# Patient Record
Sex: FEMALE | Race: WHITE | Employment: OTHER | URBAN - METROPOLITAN AREA
[De-identification: names, ages, dates, MRNs, and addresses within clinical notes are randomized per-mention and may not be internally consistent; named-entity substitution may affect disease eponyms.]

---

## 2017-03-21 PROBLEM — J18.9 PNEUMONIA DUE TO INFECTIOUS ORGANISM: Status: ACTIVE | Noted: 2017-03-21

## 2018-04-17 DIAGNOSIS — E78.00 PURE HYPERCHOLESTEROLEMIA: ICD-10-CM

## 2018-04-18 LAB
ESTIMATED AVERAGE GLUCOSE: 125.5 MG/DL
HBA1C MFR BLD: 6 %

## 2019-02-11 PROBLEM — J18.9 PNEUMONIA DUE TO INFECTIOUS ORGANISM: Status: RESOLVED | Noted: 2017-03-21 | Resolved: 2019-02-11

## 2022-03-07 PROBLEM — R94.31 ABNORMAL EKG: Status: ACTIVE | Noted: 2022-03-07

## 2022-03-07 PROBLEM — I77.810 ASCENDING AORTA DILATATION (HCC): Status: ACTIVE | Noted: 2022-03-07

## 2022-03-07 PROBLEM — R00.0 SINUS TACHYCARDIA: Status: ACTIVE | Noted: 2022-03-07

## 2022-12-12 PROBLEM — E11.9 TYPE 2 DIABETES MELLITUS (HCC): Status: ACTIVE | Noted: 2022-12-12

## 2023-06-04 PROBLEM — F33.1 MAJOR DEPRESSIVE DISORDER, RECURRENT, MODERATE (HCC): Status: ACTIVE | Noted: 2023-06-04

## 2023-06-04 PROBLEM — F33.0 MAJOR DEPRESSIVE DISORDER, RECURRENT, MILD (HCC): Status: ACTIVE | Noted: 2023-06-04

## 2023-06-09 PROBLEM — E66.01 OBESITY, CLASS III, BMI 40-49.9 (MORBID OBESITY) (HCC): Status: ACTIVE | Noted: 2023-06-09

## 2023-06-09 PROBLEM — S92.511A CLOSED DISPLACED FRACTURE OF PROXIMAL PHALANX OF LESSER TOE OF RIGHT FOOT: Status: ACTIVE | Noted: 2023-06-09

## 2023-07-10 ENCOUNTER — TELEPHONE (OUTPATIENT)
Dept: ORTHOPEDIC SURGERY | Age: 73
End: 2023-07-10

## 2023-07-10 NOTE — TELEPHONE ENCOUNTER
General Question     Subject: R FOOT BOOT   Patient and /or Facility Request: Izabella Vicente  Contact Number: [phone]    PATIENT CALLED IN TO SEE IF SHE CAN TAKE OFF HER R FOOT BOOT TODAY. SHE HAS AN APPT FOR July 20, 2023. Sherry Soliss     PLEASE ADVISE

## 2023-07-10 NOTE — TELEPHONE ENCOUNTER
Per dictation 6/9/2023     IMPRESSION: Right foot fourth toe proximal phalanx and fifth toe distal phalanx minimally displaced fracture. PLAN: I discussed that the overall alignment of this fracture is good and that we can try to treat this non-operatively in a post-op shoe with full WB and strapping. We discussed the risk of nonunion and  malunion. Rest, ice and elevation. We will see her  back in 6 weeks at which time we will get a new xray of the right foot. Patient was made aware the boot will be in place till her upcoming appointment 7/20.

## 2023-07-20 ENCOUNTER — OFFICE VISIT (OUTPATIENT)
Dept: ORTHOPEDIC SURGERY | Age: 73
End: 2023-07-20

## 2023-07-20 VITALS — HEIGHT: 66 IN | WEIGHT: 267 LBS | BODY MASS INDEX: 42.91 KG/M2

## 2023-07-20 DIAGNOSIS — S92.511A CLOSED DISPLACED FRACTURE OF PROXIMAL PHALANX OF LESSER TOE OF RIGHT FOOT, INITIAL ENCOUNTER: Primary | ICD-10-CM

## 2023-07-20 PROCEDURE — 99024 POSTOP FOLLOW-UP VISIT: CPT | Performed by: NURSE PRACTITIONER

## 2023-07-20 PROCEDURE — APPNB15 APP NON BILLABLE TIME 0-15 MINS: Performed by: NURSE PRACTITIONER

## 2023-08-11 ENCOUNTER — OFFICE VISIT (OUTPATIENT)
Dept: CARDIOLOGY CLINIC | Age: 73
End: 2023-08-11

## 2023-08-11 VITALS
OXYGEN SATURATION: 98 % | HEIGHT: 64 IN | HEART RATE: 84 BPM | WEIGHT: 266 LBS | BODY MASS INDEX: 45.41 KG/M2 | SYSTOLIC BLOOD PRESSURE: 128 MMHG | DIASTOLIC BLOOD PRESSURE: 76 MMHG

## 2023-08-11 DIAGNOSIS — I10 ESSENTIAL HYPERTENSION: ICD-10-CM

## 2023-08-11 DIAGNOSIS — I77.810 ASCENDING AORTA DILATATION (HCC): ICD-10-CM

## 2023-08-11 DIAGNOSIS — R94.31 ABNORMAL EKG: Primary | ICD-10-CM

## 2023-08-11 DIAGNOSIS — E78.5 HYPERLIPIDEMIA, UNSPECIFIED HYPERLIPIDEMIA TYPE: ICD-10-CM

## 2023-08-11 DIAGNOSIS — E66.01 CLASS 3 SEVERE OBESITY WITH SERIOUS COMORBIDITY AND BODY MASS INDEX (BMI) OF 40.0 TO 44.9 IN ADULT, UNSPECIFIED OBESITY TYPE (HCC): ICD-10-CM

## 2023-08-11 NOTE — PROGRESS NOTES
supple. No JVP or carotid bruit appreciated. No mass and no thyromegaly present. No lymphadenopathy present. Cardiovascular: Normal rate. Normal heart sounds. Exam reveals no gallop and no friction rub. No murmur heard. Pulmonary/Chest: Effort normal and breath sounds normal. No respiratory distress. She has no wheezes, rhonchi or rales. Abdominal: Soft, non-tender. Bowel sounds are normal. She exhibits no organomegaly, mass or bruit. Extremities: trace ankle edema, no cyanosis, or clubbing. Pulses are 2+ radial/dorsalis pedis/posterior tibial/carotid bilaterally. Neurological: No gross cranial nerve deficit. Coordination normal.   Skin: Skin is warm and dry. There is no rash or diaphoresis. Psychiatric: She has a normal mood and affect. Her speech is normal and behavior is normal.     Lab Review:   Lab Results   Component Value Date/Time    TRIG 159 12/12/2022 10:05 AM    HDL 53 12/12/2022 10:05 AM    LDLCALC 99 12/12/2022 10:05 AM    LABVLDL 32 12/12/2022 10:05 AM     Lab Results   Component Value Date/Time    BUN 22 12/12/2022 10:05 AM    CREATININE 0.8 12/12/2022 10:05 AM       EKG Interpretation:   6/4/21 sinus tachycardia with -Old anterior infarct. Low voltage with rightward P-axis and rotation -possible pulmonary disease. ~120-122 bpm.   3/18/22 NSR - reviewed  11/18/22 Sinus  Rhythm. ~76 bpm.     Image Review:   CT chest 7/2021  Mildly prominent ascending aorta without evident aneurysm. No follow-up   necessary. Slight cardiomegaly. Clear lungs. No other significant   abnormality. Echo: 6/16/21  Normal left ventricle size, wall thickness, and systolic function with an   estimated ejection fraction of 55-60%. No regional wall motion abnormalities are seen. Normal diastolic function. Mitral annular calcification is present. Mitral valve leaflets appear mildly thickened. Mild mitral regurgitation.    The ascending aorta is dilated & measures at 4 cms   The right ventricle is normal in

## 2023-08-17 RX ORDER — METOPROLOL SUCCINATE 50 MG/1
TABLET, EXTENDED RELEASE ORAL
Qty: 90 TABLET | Refills: 3 | Status: SHIPPED | OUTPATIENT
Start: 2023-08-17

## 2023-09-06 ENCOUNTER — HOSPITAL ENCOUNTER (OUTPATIENT)
Dept: NON INVASIVE DIAGNOSTICS | Age: 73
Discharge: HOME | End: 2023-09-06
Payer: MEDICARE

## 2023-09-06 DIAGNOSIS — R94.31 ABNORMAL EKG: ICD-10-CM

## 2023-09-06 PROCEDURE — 3430000000 HC RX DIAGNOSTIC RADIOPHARMACEUTICAL: Performed by: INTERNAL MEDICINE

## 2023-09-06 PROCEDURE — 93017 CV STRESS TEST TRACING ONLY: CPT

## 2023-09-06 PROCEDURE — 78452 HT MUSCLE IMAGE SPECT MULT: CPT

## 2023-09-06 PROCEDURE — 6360000002 HC RX W HCPCS: Performed by: INTERNAL MEDICINE

## 2023-09-06 PROCEDURE — A9502 TC99M TETROFOSMIN: HCPCS | Performed by: INTERNAL MEDICINE

## 2023-09-06 RX ORDER — REGADENOSON 0.08 MG/ML
0.4 INJECTION, SOLUTION INTRAVENOUS
Status: COMPLETED | OUTPATIENT
Start: 2023-09-06 | End: 2023-09-06

## 2023-09-06 RX ADMIN — TETROFOSMIN 30 MILLICURIE: 1.38 INJECTION, POWDER, LYOPHILIZED, FOR SOLUTION INTRAVENOUS at 10:25

## 2023-09-06 RX ADMIN — REGADENOSON 0.4 MG: 0.08 INJECTION, SOLUTION INTRAVENOUS at 10:17

## 2023-09-07 ENCOUNTER — HOSPITAL ENCOUNTER (OUTPATIENT)
Dept: NON INVASIVE DIAGNOSTICS | Age: 73
Discharge: HOME | End: 2023-09-07
Payer: MEDICARE

## 2023-09-07 PROCEDURE — A9502 TC99M TETROFOSMIN: HCPCS | Performed by: INTERNAL MEDICINE

## 2023-09-07 PROCEDURE — 3430000000 HC RX DIAGNOSTIC RADIOPHARMACEUTICAL: Performed by: INTERNAL MEDICINE

## 2023-09-07 RX ADMIN — TETROFOSMIN 30 MILLICURIE: 1.38 INJECTION, POWDER, LYOPHILIZED, FOR SOLUTION INTRAVENOUS at 09:20

## 2023-09-08 ENCOUNTER — TELEPHONE (OUTPATIENT)
Dept: CARDIOLOGY CLINIC | Age: 73
End: 2023-09-08

## 2023-09-08 DIAGNOSIS — R94.31 ABNORMAL EKG: Primary | ICD-10-CM

## 2023-09-08 DIAGNOSIS — R94.39 ABNORMAL STRESS TEST: ICD-10-CM

## 2023-09-08 NOTE — TELEPHONE ENCOUNTER
Fabiola Stone called for the results of her 2 day stress test.    Yanely Acuna callback: [phone]

## 2023-09-08 NOTE — TELEPHONE ENCOUNTER
This will be resulted today per Dr. Amarilis Mobley and our office will contact the patient accordingly.

## 2023-10-25 ENCOUNTER — HOSPITAL ENCOUNTER (OUTPATIENT)
Dept: CARDIOLOGY | Age: 73
Discharge: HOME | End: 2023-10-25
Payer: MEDICARE

## 2023-10-25 DIAGNOSIS — R94.39 ABNORMAL STRESS TEST: ICD-10-CM

## 2023-10-25 DIAGNOSIS — R94.31 ABNORMAL EKG: ICD-10-CM

## 2023-10-25 PROCEDURE — 93306 TTE W/DOPPLER COMPLETE: CPT

## 2024-02-12 ENCOUNTER — OFFICE VISIT (OUTPATIENT)
Dept: CARDIOLOGY CLINIC | Age: 74
End: 2024-02-12
Payer: MEDICARE

## 2024-02-12 VITALS
OXYGEN SATURATION: 98 % | WEIGHT: 266 LBS | HEIGHT: 64 IN | BODY MASS INDEX: 45.41 KG/M2 | SYSTOLIC BLOOD PRESSURE: 134 MMHG | HEART RATE: 76 BPM | DIASTOLIC BLOOD PRESSURE: 76 MMHG

## 2024-02-12 DIAGNOSIS — E66.01 CLASS 3 SEVERE OBESITY WITH SERIOUS COMORBIDITY AND BODY MASS INDEX (BMI) OF 40.0 TO 44.9 IN ADULT, UNSPECIFIED OBESITY TYPE (HCC): ICD-10-CM

## 2024-02-12 DIAGNOSIS — I77.810 ASCENDING AORTA DILATATION (HCC): ICD-10-CM

## 2024-02-12 DIAGNOSIS — R94.31 ABNORMAL EKG: Primary | ICD-10-CM

## 2024-02-12 DIAGNOSIS — M79.89 LEG SWELLING: ICD-10-CM

## 2024-02-12 DIAGNOSIS — I10 ESSENTIAL HYPERTENSION: ICD-10-CM

## 2024-02-12 DIAGNOSIS — E78.5 HYPERLIPIDEMIA, UNSPECIFIED HYPERLIPIDEMIA TYPE: ICD-10-CM

## 2024-02-12 PROCEDURE — 3078F DIAST BP <80 MM HG: CPT | Performed by: INTERNAL MEDICINE

## 2024-02-12 PROCEDURE — G8417 CALC BMI ABV UP PARAM F/U: HCPCS | Performed by: INTERNAL MEDICINE

## 2024-02-12 PROCEDURE — 3017F COLORECTAL CA SCREEN DOC REV: CPT | Performed by: INTERNAL MEDICINE

## 2024-02-12 PROCEDURE — G8400 PT W/DXA NO RESULTS DOC: HCPCS | Performed by: INTERNAL MEDICINE

## 2024-02-12 PROCEDURE — 1090F PRES/ABSN URINE INCON ASSESS: CPT | Performed by: INTERNAL MEDICINE

## 2024-02-12 PROCEDURE — 1036F TOBACCO NON-USER: CPT | Performed by: INTERNAL MEDICINE

## 2024-02-12 PROCEDURE — 99214 OFFICE O/P EST MOD 30 MIN: CPT | Performed by: INTERNAL MEDICINE

## 2024-02-12 PROCEDURE — G8427 DOCREV CUR MEDS BY ELIG CLIN: HCPCS | Performed by: INTERNAL MEDICINE

## 2024-02-12 PROCEDURE — 1123F ACP DISCUSS/DSCN MKR DOCD: CPT | Performed by: INTERNAL MEDICINE

## 2024-02-12 PROCEDURE — 3075F SYST BP GE 130 - 139MM HG: CPT | Performed by: INTERNAL MEDICINE

## 2024-02-12 PROCEDURE — G8484 FLU IMMUNIZE NO ADMIN: HCPCS | Performed by: INTERNAL MEDICINE

## 2024-02-12 RX ORDER — TIZANIDINE 4 MG/1
4 TABLET ORAL EVERY 8 HOURS PRN
COMMUNITY
Start: 2024-02-01

## 2024-02-12 RX ORDER — LATANOPROST 50 UG/ML
1 SOLUTION/ DROPS OPHTHALMIC NIGHTLY
COMMUNITY
Start: 2024-01-25

## 2024-02-12 NOTE — PROGRESS NOTES
[facility]      Cardiology Progress Note    Elin Vargas  1950 February 12, 2024    Referring Physician: Federico Alvarado MD      CC: \"I have no heart symptoms.\"     HPI:  The patient is 73 y.o. female with a past medical history significant for anxiety, depression, asthma, chronic back pain, GERD, Gout, HLD, HTN,  On 6/4/21 Patient presented as a new patient 6/2021 for abnormal EKG per pre-op testing  She denies any cardiac sounding complaints.     She was seen in office on 11/08/2022 for 8 month follow up and  reported she is going \"really well.\" She reported Dr. Olson just started her on amlodipine a week ago for elevated BP. She reports that her BP at home is typically <125/70 home monitor. She reports monitoring her low sodium diet with her cochlear implant. She is reading food labels, keeping < 200 mg daily. Patient denies exertional chest pain/pressure, dyspnea at rest, GARRIDO, PND, orthopnea, palpitations, lightheadedness, weight changes, changes in LE edema, and syncope. Recent workup for hypomagnesia and hypothyroid, osteoporosis right arm-ENT and endocrinology. She admits to medical therapy compliance and tolerating.     8/11/23 she reports back pain for which she has been receiving injections and stated that if injections do not help they are planning on surgery. She has swelling in her bilateral lower extremities and has compression stocking at home but has not been wearing them.  9/8/23 nuclear stress test is indicative of prior MI please have patient is scheduled for an echocardiogram 10/25/2023 stable. Today, overall she reports she is doing well and feeling well from a cardiac standpoint with no specific cardiac complaints. She has new walking shoes which are helping her sciatica. She is monitoring her BS and BP at home and have been stable. Her A1c has improved from 6.3 to 5.9. Patient denies exertional chest pain/pressure, dyspnea at rest, GARRIDO, PND, orthopnea, palpitations,

## 2024-05-15 ENCOUNTER — TRANSCRIBE ORDERS (OUTPATIENT)
Dept: ADMINISTRATIVE | Age: 74
End: 2024-05-15

## 2024-05-15 DIAGNOSIS — N23 RENAL COLIC: ICD-10-CM

## 2024-05-15 DIAGNOSIS — N20.1 CALCULUS OF URETER: Primary | ICD-10-CM

## 2024-05-21 ENCOUNTER — HOSPITAL ENCOUNTER (OUTPATIENT)
Dept: ULTRASOUND IMAGING | Age: 74
Discharge: HOME | End: 2024-05-21
Payer: MEDICARE

## 2024-05-21 DIAGNOSIS — N23 RENAL COLIC: ICD-10-CM

## 2024-05-21 DIAGNOSIS — N20.1 CALCULUS OF URETER: ICD-10-CM

## 2024-05-21 PROCEDURE — 76770 US EXAM ABDO BACK WALL COMP: CPT

## 2024-07-17 LAB
ALBUMIN URINE, EXTERNAL: 0.8
CREATININE, URINE, EXTERNAL: 171.9
MICROALBUMIN/CREAT UR: 5 MG/G{CREAT}

## 2024-07-29 RX ORDER — METOPROLOL SUCCINATE 50 MG/1
50 TABLET, EXTENDED RELEASE ORAL DAILY
Qty: 90 TABLET | Refills: 3 | Status: SHIPPED | OUTPATIENT
Start: 2024-07-29

## 2024-07-29 NOTE — TELEPHONE ENCOUNTER
Last OV: 2/12/24  Next OV: Requested to return in one year.  Not scheduled.  Last refill: 8/17/23  Most recent Labs: CMP 1/22/24  Last EKG (if needed):

## 2024-08-14 ENCOUNTER — TELEPHONE (OUTPATIENT)
Dept: CARDIOLOGY CLINIC | Age: 74
End: 2024-08-14

## 2024-08-14 NOTE — TELEPHONE ENCOUNTER
Dr. Smith,   We see the patient yearly. Received fax from Dr. Middleton's office visit 8/13/24 with the patient and wanted to update you.   She follows with Dr. Middleton for persistent asthma with medical therapy. Completed PFT and  6-minute her walk showed normal oxygenation dropping only from 98% to 97% but she only walked 722 feet and her heart rate was inappropriately elevated rising from . I think the 6-minute walk reflects deconditioning. Dr. Middleton talked to her about the relationship between asthma and obesity and told her that the new weight reduction drugs would probably help many of her comorbidities including her asthma.     She is on metoprolol succinate 50 mg daily, nifedipine 30 mg daily for her HTN.

## 2024-08-14 NOTE — TELEPHONE ENCOUNTER
Patient likely has tachycardia from deconditioning.  She is currently on metoprolol therapy.  I will defer the decision to add weight reduction drugs to her PCP.

## 2024-10-31 RX ORDER — METOPROLOL SUCCINATE 50 MG/1
50 TABLET, EXTENDED RELEASE ORAL DAILY
Qty: 90 TABLET | Refills: 4 | Status: SHIPPED | OUTPATIENT
Start: 2024-10-31

## 2024-10-31 NOTE — TELEPHONE ENCOUNTER
Medication Refill    When was your last appointment with cardiology?    (If 1 yr or longer, please schedule appointment)    (If patient has been told they do not need to follow-up - medications should be filled by PCP)  02/2024    When did you last have labs drawn?     Medication needing refilled?  metoprolol succinate (TOPROL XL)     Dosage of the medication?  50 MG extended release tablet     How are you taking this medication (QD, BID, TID, QID, PRN)?  TAKE 1 TABLET BY MOUTH DAILY     Do you want a 30 or 90 day supply?  90 day supply    When will you run out of your medication?   Few days left    Which Pharmacy are we sending this medication to?  [facility] [id number] - [facility] [address]     Pharmacy Phone: [phone]  Pharmacy Fax:[phone]

## 2024-10-31 NOTE — TELEPHONE ENCOUNTER
Last OV: 02/12/24  Next OV: not scheduled.  Requested to return in one year.  Last refill: 7/29/24  Most recent Labs: CMP 1/22/24  Last EKG (if needed):

## 2025-02-20 NOTE — PROGRESS NOTES
[facility]      Cardiology Progress Note    Elin Vargas  1950 February 24, 2025    Referring Physician: Federico Alvarado MD      CC: \"I have no heart symptoms.\"     HPI:  The patient is 74 y.o. female with a past medical history significant for anxiety, depression, asthma, chronic back pain, GERD, Gout, HLD, HTN,  On 6/4/21 Patient presented as a new patient 6/2021 for abnormal EKG per pre-op testing  She denies any cardiac sounding complaints.     She was seen in office on 11/08/2022 for 8 month follow up and  reported she is going \"really well.\" She reported Dr. Olson just started her on amlodipine a week ago for elevated BP. She reports that her BP at home is typically <125/70 home monitor. She reports monitoring her low sodium diet with her cochlear implant. She is reading food labels, keeping < 200 mg daily.  Recent workup for hypomagnesia and hypothyroid, osteoporosis right arm-ENT and endocrinology.    8/11/23 she reports back pain for which she has been receiving injections and stated that if injections do not help they are planning on surgery. She has swelling in her bilateral lower extremities and has compression stocking at home but has not been wearing them.  9/8/23 nuclear stress test is indicative of prior MI please have patient is scheduled for an echocardiogram 10/25/2023 stable.     2/12/24, overall she reports she is doing well and feeling well from a cardiac standpoint with no specific cardiac complaints. She has new walking shoes which are helping her sciatica. She is monitoring her BS and BP at home and have been stable. Her A1c has improved from 6.3 to 5.9. Improvement in asthma with medication changes with inhalers.     Today, overall she reports she is doing well and feeling well from a cardiac standpoint with no specific cardiac complaints. Patient denies exertional chest pain/pressure, dyspnea at rest, GARRIDO, PND, orthopnea, palpitations, lightheadedness, weight

## 2025-02-24 ENCOUNTER — OFFICE VISIT (OUTPATIENT)
Dept: CARDIOLOGY CLINIC | Age: 75
End: 2025-02-24

## 2025-02-24 VITALS
SYSTOLIC BLOOD PRESSURE: 130 MMHG | DIASTOLIC BLOOD PRESSURE: 68 MMHG | OXYGEN SATURATION: 97 % | WEIGHT: 262 LBS | HEART RATE: 76 BPM | BODY MASS INDEX: 44.73 KG/M2 | HEIGHT: 64 IN

## 2025-02-24 DIAGNOSIS — I10 ESSENTIAL HYPERTENSION: ICD-10-CM

## 2025-02-24 DIAGNOSIS — R94.31 ABNORMAL EKG: Primary | ICD-10-CM

## 2025-02-24 DIAGNOSIS — E66.01 CLASS 3 SEVERE OBESITY WITH SERIOUS COMORBIDITY AND BODY MASS INDEX (BMI) OF 40.0 TO 44.9 IN ADULT, UNSPECIFIED OBESITY TYPE: ICD-10-CM

## 2025-02-24 DIAGNOSIS — E78.5 HYPERLIPIDEMIA, UNSPECIFIED HYPERLIPIDEMIA TYPE: ICD-10-CM

## 2025-02-24 DIAGNOSIS — I77.810 ASCENDING AORTA DILATATION: ICD-10-CM

## 2025-02-24 DIAGNOSIS — E66.813 CLASS 3 SEVERE OBESITY WITH SERIOUS COMORBIDITY AND BODY MASS INDEX (BMI) OF 40.0 TO 44.9 IN ADULT, UNSPECIFIED OBESITY TYPE: ICD-10-CM

## 2025-02-24 RX ORDER — DIAZEPAM 5 MG/1
TABLET ORAL PRN
COMMUNITY
Start: 2024-12-10

## 2025-03-21 ENCOUNTER — TELEPHONE (OUTPATIENT)
Dept: CARDIOLOGY CLINIC | Age: 75
End: 2025-03-21